# Patient Record
Sex: MALE | Race: OTHER | HISPANIC OR LATINO | URBAN - METROPOLITAN AREA
[De-identification: names, ages, dates, MRNs, and addresses within clinical notes are randomized per-mention and may not be internally consistent; named-entity substitution may affect disease eponyms.]

---

## 2022-02-07 ENCOUNTER — OFFICE VISIT (OUTPATIENT)
Dept: URGENT CARE | Facility: CLINIC | Age: 47
End: 2022-02-07
Payer: COMMERCIAL

## 2022-02-07 VITALS
SYSTOLIC BLOOD PRESSURE: 114 MMHG | BODY MASS INDEX: 31.93 KG/M2 | DIASTOLIC BLOOD PRESSURE: 66 MMHG | RESPIRATION RATE: 16 BRPM | TEMPERATURE: 98 F | WEIGHT: 191.63 LBS | OXYGEN SATURATION: 98 % | HEART RATE: 52 BPM | HEIGHT: 65 IN

## 2022-02-07 DIAGNOSIS — Z76.89 ESTABLISHING CARE WITH NEW DOCTOR, ENCOUNTER FOR: ICD-10-CM

## 2022-02-07 DIAGNOSIS — N42.81 PROSTATE PAIN: ICD-10-CM

## 2022-02-07 DIAGNOSIS — Z87.438 HISTORY OF BPH: ICD-10-CM

## 2022-02-07 DIAGNOSIS — R35.0 URINARY FREQUENCY: Primary | ICD-10-CM

## 2022-02-07 LAB
BILIRUB UR QL STRIP: NEGATIVE
GLUCOSE UR QL STRIP: NEGATIVE
KETONES UR QL STRIP: NEGATIVE
LEUKOCYTE ESTERASE UR QL STRIP: NEGATIVE
PH, POC UA: 6.5
POC BLOOD, URINE: POSITIVE
POC NITRATES, URINE: NEGATIVE
PROT UR QL STRIP: NEGATIVE
SP GR UR STRIP: 1 (ref 1–1.03)
UROBILINOGEN UR STRIP-ACNC: ABNORMAL (ref 0.3–2.2)

## 2022-02-07 PROCEDURE — 81003 POCT URINALYSIS, DIPSTICK, AUTOMATED, W/O SCOPE: ICD-10-PCS | Mod: QW,S$GLB,, | Performed by: NURSE PRACTITIONER

## 2022-02-07 PROCEDURE — 99204 OFFICE O/P NEW MOD 45 MIN: CPT | Mod: S$GLB,,, | Performed by: NURSE PRACTITIONER

## 2022-02-07 PROCEDURE — 99204 PR OFFICE/OUTPT VISIT, NEW, LEVL IV, 45-59 MIN: ICD-10-PCS | Mod: S$GLB,,, | Performed by: NURSE PRACTITIONER

## 2022-02-07 PROCEDURE — 81003 URINALYSIS AUTO W/O SCOPE: CPT | Mod: QW,S$GLB,, | Performed by: NURSE PRACTITIONER

## 2022-02-07 RX ORDER — NAPROXEN 500 MG/1
500 TABLET ORAL 2 TIMES DAILY PRN
Qty: 30 TABLET | Refills: 0 | Status: SHIPPED | OUTPATIENT
Start: 2022-02-07

## 2022-02-07 NOTE — PROGRESS NOTES
"Subjective:       Patient ID: Phan Reese is a 46 y.o. male.    Vitals:  height is 5' 5" (1.651 m) and weight is 86.9 kg (191 lb 9.6 oz). His temperature is 98 °F (36.7 °C). His blood pressure is 114/66 and his pulse is 52 (abnormal). His respiration is 16 and oxygen saturation is 98%.     Chief Complaint: Benign Prostatic Hypertrophy (Pt has been having trouble with his prostate with back pain in his kidneys. Pt has urinary frequency and abdominal pain. Pt has a history of problems with his prostate. A doctor in [place] told him that he has BPD. He was given a medication when he was in [place], but he says the medication did not work. )    Phan Reese is a 46 y.o. male who presents to clinic for evaluation of lateral upper back discomfort and urinary frequency for the past 2 weeks.  He also reports 2 years of prostate pain, he states he was diagnosed with BPH in [place] which is where he resides.  He is not currently taking medication for his symptoms.  He denies fever, chills, nausea, vomiting, diarrhea or penile discharge.    ROS     ROS: As per HPI and below:   General: No fever.   HENT: No facial pain.   Eyes: No eye pain.   Cardiovascular: No chest pain.   Respiratory: No dyspnea.   GI:+ lower abdominal pain. No vomiting. No diarrhea.   : + urinary frequency.  Skin: No rashes.  Neuro: No syncope.    Musculoskeletal: No extremity pain. No swelling.    All other systems negative.   Objective:      Physical Exam    Nursing note and vitals reviewed.  /66 (BP Location: Left arm)   Pulse (!) 52   Temp 98 °F (36.7 °C)   Resp 16   Ht 5' 5" (1.651 m)   Wt 86.9 kg (191 lb 9.6 oz)   SpO2 98%   BMI 31.88 kg/m²       Constitutional: AAOx3. No immediate or signs of toxicity or distress.  Eyes: EOMI. No discharge. Anicteric.  HENT:   Neck: Normal range of motion. Neck supple.  Cardiovascular: 52 BPM.   Pulmonary/Chest: No respiratory distress. Effort normal.  No tachypnea. Speaking in full sentences. "   Abdominal: No distension and no mass. Bowel sounds normal. No abdominal TTP. No CVA tenderness bilaterally.  Musculoskeletal: Appropriate range of motion.   Neurological: GCS 15. Alert and oriented to person, place, and time. No gross cranial nerve, light touch or strength deficit.   Skin: Skin is warm and dry.   Psychiatric: Behavior is normal. Judgment normal.    POCT Urinalysis: + 5 RBCs.  Urine Culture: results pending  CT/NG, T. Vaginalis: results pending  Assessment:       1. Urinary frequency    2. Establishing care with new doctor, encounter for    3. Prostate pain    4. History of BPH          Plan:       VSS. POCT Urinalysis notable for 5 RBCs. Urine culture and CT/NG, T. Vaginalis results pending. I discussed with the patient using a  that he needs to follow up with PCP, referral placed to establish care. Advised to go to the ED should he develop fever, worsening pain, uncontrollable vomiting. Prescription sent to pharmacy for naproxen BID PRN pain. Verbalized understanding and all questions answered.    Urinary frequency  -     POCT Urinalysis, Dipstick, Automated, W/O Scope  -     Culture, Urine  -     CT/NG, T. vaginalis; Future; Expected date: 02/07/2022    Establishing care with new doctor, encounter for  -     Ambulatory referral/consult to Family Practice    Prostate pain    History of BPH    Other orders  -     naproxen (NAPROSYN) 500 MG tablet; Take 1 tablet (500 mg total) by mouth 2 (two) times daily as needed (Pain). Take With Meals  Dispense: 30 tablet; Refill: 0

## 2022-02-07 NOTE — PATIENT INSTRUCTIONS
You have been given an [facility] doctor referral. If you don't hear from them in a few days call [phone]    [facility]: [address] [facility] [place] [address]. [phone]    [facility]: [address] [place] [address] [phone]     [facility]: (1) [address] [place] [address]. [phone]. (2) [address] [place] [address]. [phone]

## 2022-02-09 LAB
BACTERIA UR CULT: NO GROWTH
BACTERIA UR CULT: NORMAL

## 2022-02-10 ENCOUNTER — TELEPHONE (OUTPATIENT)
Dept: URGENT CARE | Facility: CLINIC | Age: 47
End: 2022-02-10
Payer: COMMERCIAL

## 2022-02-10 LAB
C TRACH RRNA SPEC QL NAA+PROBE: NEGATIVE
N GONORRHOEA RRNA SPEC QL NAA+PROBE: NEGATIVE
T VAGINALIS DNA SPEC QL NAA+PROBE: NEGATIVE

## 2022-02-11 NOTE — TELEPHONE ENCOUNTER
----- Message from Heidi Brian NP sent at 2/9/2022  6:34 PM CST -----  Please inform patient that urine culture did not grow any bacteria, therefore there is no indication of bladder infection.   If symptoms persist, f/u with PCP or return to clinic for evaluation.

## 2022-02-11 NOTE — TELEPHONE ENCOUNTER
----- Message from Meena Morton NP sent at 2/10/2022  7:55 AM CST -----  Please notify patient that urine came back negative for GC, chlamydia, and trich.